# Patient Record
Sex: FEMALE | Race: WHITE | URBAN - NONMETROPOLITAN AREA
[De-identification: names, ages, dates, MRNs, and addresses within clinical notes are randomized per-mention and may not be internally consistent; named-entity substitution may affect disease eponyms.]

---

## 2019-02-26 NOTE — PATIENT DISCUSSION
Contact lens prescription issued.  Contact lens trials issued; patient to RTO for insertion and removal training.

## 2019-12-26 NOTE — PATIENT DISCUSSION
This visual field clearly demonstrated a minimum of 52% loss of upper field of vision OU, with upper lid skin in repose and elevated by taping of the lid to demonstrate potential correction. This field shows that taping the lids significantly improved this patient's superior field of vision by approximately 52%, OU.

## 2020-05-07 NOTE — PATIENT DISCUSSION
Patient informed of available non-opioid medications and other non-pharmacological options. Discussed advantages and disadvantages of the alternatives, including whether the patient is at-risk for, or has a history of, controlled substance abuse or misuse, and the patient’s personal preferences. [facility] “Opioid Alternative Pamphlet” was provided to patient.

## 2020-05-13 NOTE — PROCEDURE NOTE: SURGICAL
<strong>MR #:</strong>&nbsp; [id number]<br /><br /><strong>PREOPERATIVE DIAGNOSIS: &nbsp; </strong>1. Facial jowls. &nbsp; 2. Facial rhytids. &nbsp; 3. Platysmal bands. &nbsp; 4. Loss of volume to chin, prejowl and cheeks. <br />&nbsp; <strong>&nbsp; </strong><br /><strong>POSTOPERATIVE DIAGNOSIS: &nbsp; &nbsp; </strong>Same.<br /><br /><strong>PROCEDURE: &nbsp; </strong>1. Lower facelift. <strong>&nbsp;&nbsp;</strong>2. Platysmaplasty. &nbsp; 3. Total 9 cc&rsquo;s of SMAS&nbsp; 6cc injected into the chin, prejowl and 1.5 into the cheeks. <br /><strong>&nbsp; &nbsp; </strong><br /><strong>ANESTHESIA: &nbsp; &nbsp; </strong>Local MAC.<br /><br /><strong>ESTIMATED BLOOD LOSS: &nbsp; </strong>Minimal, &lt;60 cc <br /><br /><strong>COMPLICATIONS: &nbsp; </strong>None.<br /><br /><strong>INDICATION: </strong>This patient had jowl formation with poor definition of the cervico/mental angle and fullness in the submental area. We have decided to perform a lower facelift to better define the jaw line and neck. This patient also had * they wished to address at this time. &nbsp; We have discussed various treatment options and will fill in these areas with SMAS. The patient understands the risks and benefits including bleeding, infection, numbness, nerve injury, recurrent jowls, wrinkles and need for further surgery and wishes to proceed. &nbsp; <br /><strong>&nbsp;</strong><br /><strong>PROCEDURE: </strong>Prior to surgery, a time-out was performed in the operative area. The team, consisting of the surgeon, nurse anesthetist, circulating nurse, and two medical assistants were gathered around the patient. The patient&rsquo;s identity and name were confirmed, as well as and the site of the surgery and the type of surgery and procedure to be performed. The patient was then sedated under monitored anesthesia care. I proceeded with the marking of the patient with a blue marking pen, in the areas of surgery to be performed. The patient&rsquo;s facial wrinkles and jowls were marked, as were the proposed pre and post auricular and submental incisions designed and fashioned for maximum scar camouflage. After measuring and marking the facial wrinkles, the fatty pad under the chin was palpated and marked in its dimensions. The incision lines were injected with 24 cc&rsquo;s of Xylocaine 2% with epinephrine along the pre-marked incision lines under the chin and around the ears. The patient then had Alcaine drops placed in both eyes as well as eye lubricant. Next, the patient&rsquo;s face was prepped with Betadine solution and draped in a typical sterile fashion as per facial plastic surgery. <br /><br />First a 15 blade was then used to make incisions under the chin and in front and behind of both ears in the pre-marked areas. A Tumescent anesthesia solution consisting of 50 cc's of Xylocaine 2% with 1 cc of 1:1000 epinephrine, 12.5 cc's of Sodium Bicarbonate and 0.25 cc's of Triamolone added to a 500 ml bag of 0.9% Sodium Chloride was introduced through the previously made incisions with a garden spray cannula. Infiltration of this solution was carried out in the subdermal plane along the right and left face and under the chin. This was performed to provide anesthesia and hemostasis and create a dissection plane. Approximately 400 ml in total was used. A double pronged skin hook was then placed into the chin incision and a facelift scissor was used to remove excess submental fat off the platysmal bands, releasing tethers from the underlying subcutaneous tissue. &nbsp; This was done with full skin release from side to side. &nbsp; Once this was performed all bleeders were cauterized with a bipolar cautery. &nbsp; A retractor was placed into the submental wound. &nbsp; The platysma bands were easily visualized. The platysma bands were then sutured in the midline with a running 3-0 vicryl suture bringing together the two ends of the platysma muscle medially from the chin down to the thyroid cartilage to tighten the neck. &nbsp; After this was performed the submental wound was then closed with interrupted buried 5-0 vicryl sutures deep then a running 5-0 prolene suture superficially. <br /><br />Next the head was then turned to the right and two double prong skin hooks were then placed through the skin flaps for upward traction. A facelift scissors was used to elevate the right part of the facial skin flap. Care was taken to dissect superficially above this SMAS area to avoid any injury to the facial nerve or vascular structures. This dissection was extended with the help of Aviir. Undermining was performed to 6cm anterior to the pre-auricular area on the right side and then downward over the jaw line onto the neck. This connected the lateral pocket to the central previously dissected pocket allowing full skin release. Dissection was carried down over the skin of the posterior neck and elevation of the flap was achieved using counter traction against the facelift scissors releasing the full skin flap on the right side. Care was taken not to buttonhole this thin skin and also not to dissect too deep in this area, avoiding subsequent injury to the greater auricular nerve. After elevation of this large skin flap on the right side, all bleeders were cauterized with a bipolar cautery. After excellent hemostasis was achieved, attention was turned to SMAS elevation. A facelift scissors and Adson forceps were used to perform a SMAS stripectomy. A vertical and horizontal strip of SMAS overlying the parotid fascia in an upside down &ldquo;L&rdquo; configuration was excised down to intact parotid fascia. The SMAS strip that was removed in the preauricular area, measured approximately 1cm in diameter by 5 cm in length. All bleeders were cauterized using a bipolar cautery. Care was taken to stay above the facial nerve. This SMAS layer was then lifted anteriorly in a vertical direction and plicated by placing 4 interrupted 3 0 Vicryl buried sutures through this SMAS and advancing it superiorly to tighten the neck. The first suture was placed through the SMAS at the angle of the mandible and advanced upward to the pre tragal fascia. This was secured and three additional sutures were placed superiorly to this to further advance this SMAS superiorly. Likewise, three interrupted Vicryl sutures were placed through the inferior aspect of the platysma muscle and advanced laterally and sutured to the posterior mastoid fascia. These sutures were secured with an excellent tightening seen of the jaw line and lower face. After this was performed, the skin was gently re-draped two over the right ear. The excess skin on the right was marked and then first trimmed in the temple area and in the mastoid area. Two marking sutures at 3-0 plain sutures were used to secure the skin in place in the pre-auricular and mastoid area. The tissue in the temporal area and mastoid area was re-approximated using a layered closure of 5-0 vicryl deep and 5-0 prolene sutures superficially. The preauricular excess skin was then excised with a facelift scissors above and below the tragus and this pre tragal area trimmed and thinned using a facelift scissors. A 5 0 vicryl deep suture and 5-0 prolene running suture was then used to re-approximate the pre-auricular wound. The skin flap around the ear was tension free, avoiding any downward pull on this earlobe. Posteriorly excess skin was then trimmed from the posterior ear. The remaining aspect to this posterior ear wound was closed with 5-0 vicryl deep and 5-0 chromic sutures superficially. <br /><br />After completion of the right side of the face, the head was then turned to the left where the same operation was performed. At the conclusion of the case, 9cc&rsquo;s of SMAS in total was injected. &nbsp; 6cc&rsquo;s into chin, prejowl and 1.5 into the cheeks to smooth the appearance and volume to these areas. The facial wounds were cleaned with peroxide and Vaseline soaked gauze and antibiotic ointment was placed over her wounds on the areas below the chin and around both ears. Fluffs were placed over both ears as was a Kerlix dressing. This dressing was then secured to the head using an Ace bandage. The patient had received 8 mg of Decadron, 1g of Ancef and 4 mg of Zofran in the operating room. The patient tolerated the procedure well and was sent to the recovery room in stable condition. <br />

## 2020-05-13 NOTE — PATIENT DISCUSSION
Patient informed of available non-opioid medications and other non-pharmacological options. Discussed advantages and disadvantages of the alternatives, including whether the patient is at-risk for, or has a history of, controlled substance abuse or misuse, and the patient’s personal preferences. [facility] “Opioid Alternative Pamphlet” was provided to patient.

## 2020-05-13 NOTE — PATIENT DISCUSSION
Patient informed of available non-opioid medications and other non-pharmacological options. Discussed advantages and disadvantages of the alternatives, including whether the patient is at-risk for, or has a history of, controlled substance abuse or misuse, and the patient’s personal preferences. [facility] “Opioid Alternative Pamphlet” was provided to patient.

## 2020-06-04 NOTE — PATIENT DISCUSSION
3 week post op: great curve and shape, no infection, healing well. Wear sunglasses and hat while outdoors. Avoid sun exposure. No restrictions in 5 days.

## 2020-06-04 NOTE — PATIENT DISCUSSION
EEG Electrode Placement    Procedure Performed: EMU    Interpreting Physician:  Luda    Patient Room:      Measurements:  10-20 System    Supplies: Glue -  Collodion and Paste -  Ten20    Skin was prepped with Cavilon wipe.  Foam dressing applied behind each electrode on exposed skin to minimize skin irritation.    Electrode placement adjusted due to:  Drain and Incision T4, T6 shifted posterior; T3, T5 shifted posterior; P4 shifted slightly downward     Monitor cataract for progression.

## 2020-06-04 NOTE — PATIENT DISCUSSION
3 week post op: great curve and shape, no infection, healing well. Use Vitamin E oil/Skinuva QHS x 1 month to incisions after 10 days. Wear sunglasses and hat while outdoors. Avoid sun exposure.

## 2020-10-22 NOTE — PATIENT DISCUSSION
Post op: great curve and shape, no infection, healing well. Wear sunglasses and hat while outdoors. Avoid sun exposure.

## 2021-07-23 ENCOUNTER — IMPORTED ENCOUNTER (OUTPATIENT)
Dept: URBAN - NONMETROPOLITAN AREA CLINIC 1 | Facility: CLINIC | Age: 51
End: 2021-07-23

## 2021-07-23 PROBLEM — H52.222: Noted: 2021-07-23

## 2021-07-23 PROBLEM — H52.4: Noted: 2021-07-23

## 2021-07-23 PROBLEM — H52.13: Noted: 2021-07-23

## 2021-07-23 PROCEDURE — 92310 CONTACT LENS FITTING OU: CPT

## 2021-07-23 PROCEDURE — S0620 ROUTINE OPHTHALMOLOGICAL EXA: HCPCS

## 2021-07-23 NOTE — PATIENT DISCUSSION
Simple Myopia OD/Compound Myopic Astigmatism OS w/Presbyopia-  discussed findings w/patient-  new spectacle/CL Rx issued-  continue to monitor yearly or prn; 's Notes: MR 7/23/2021DFE 7/23/2021

## 2022-04-09 ASSESSMENT — VISUAL ACUITY
OD_SC: 20/20
OU_SC: 20/20
OU_CC: 20/20
OS_SC: 20/20

## 2022-04-09 ASSESSMENT — TONOMETRY
OS_IOP_MMHG: 15
OD_IOP_MMHG: 16

## 2022-04-09 ASSESSMENT — KERATOMETRY
OS_K1POWER_DIOPTERS: 43.75
OD_AXISANGLE_DEGREES: 045
OD_K1POWER_DIOPTERS: 43.25
OS_AXISANGLE_DEGREES: 138
OD_K2POWER_DIOPTERS: 43.75
OS_K2POWER_DIOPTERS: 44.00

## 2022-10-18 NOTE — PATIENT DISCUSSION
Recommend Bilateral upper lid blepharoplasty. Predeterm(discussed risks and benefits of sx. .. ). none

## 2022-12-19 NOTE — PATIENT DISCUSSION
Recommend using Sente Retinol Product. Tano Arce Patient Age: 35 year old  MESSAGE: Interpreting service used: No    Insurance on file confirmed with caller: Yes    IM/FP- Symptom-  Adult-  Other-     Symptom(s): blood sugar was at 330 12/18 today is 290          Appointment: Patient scheduled an appointment on 12/20  at 8:30 a.m. with Dr. Rodriguez  at [facility]. Caller is requesting a sooner appointment.     Site: [facility]- Routed message to RN pool [[phone]]    Advised caller that a nurse will return the call.     Message read back to caller for accuracy: Yes       ALLERGIES:  Patient has no known allergies.  Current Outpatient Medications   Medication Sig Dispense Refill   • fluticasone (FLONASE) 50 MCG/ACT nasal spray Spray 1 spray in each nostril in the morning and 1 spray in the evening. 16 g 0   • amoxicillin (AMOXIL) 500 MG tablet Take 1 tablet by mouth in the morning and 1 tablet in the evening. Do all this for 10 days. 20 tablet 0   • diazePAM (Valium) 5 MG tablet Take 1 tablet by mouth 3 times daily as needed for Muscle spasms. 15 tablet 0   • guaiFENesin (MUCINEX) 600 MG 12 hr tablet Take 1-2 tablets by mouth 2 times daily. 16 tablet 0     No current facility-administered medications for this visit.     PHARMACY to use: see below          Pharmacy preference(s) on file:   [id number] - [place], [place] - [address] AT [facility] [address]  [address]  [facility] [address]  Phone: [phone] Fax: [phone]      CALL BACK INFO: Ok to leave response (including medical information) on answering machine      PCP: Kay Rodriguez MD         INS: Payor: BLUE [employer]OSS BLUE [employer]ELD [place] / Plan: PPO [id number] / Product Type: PPO MISC   PATIENT ADDRESS:  [address] [place] [id number]

## 2023-12-18 NOTE — PROCEDURE NOTE: SURGICAL
1.  Please return to clinic at your next scheduled visit.  Contact the clinic ([phone]) at least 24 hours prior in the event you need to cancel.  2.  Do no harm to yourself or others.    3.  Avoid alcohol and drugs.    4.  Take all medications as prescribed.  Please contact the clinic with any concerns. If you are in need of medication refills, please call the clinic at [phone].    5. Should you want to get in touch with your provider, Dr. Melissa Monte, please utilize [facility] or contact the office ([phone]), and staff will be able to page Dr. Monte directly.  6.  In the event you have personal crisis, contact the following crisis numbers: Suicide Prevention Hotline 1-143.806.9579; PETEY Helpline 5-036-714-PETEY; [facility] Emergency Room [phone]; text HELLO to 948653; or 675.                MR #:&nbsp; [id number]<br /><br />PREOPERATIVE DIAGNOSIS:&nbsp; &nbsp; &nbsp;1. Dermatochalasis upper lids; 2. Lower lid fat herniation and excess skin<br /><br />POSTOPERATIVE DIAGNOSIS:&nbsp; &nbsp;Same<br /><br />PROCEDURE:&nbsp; &nbsp;1. Upper lid blepharoplasty both eyes; 2. C02 Laser transconjunctival blepharoplasty lower lids; 3. Skin resurfacing of lower lids<br /><br />ANESTHESIA:&nbsp; &nbsp; &nbsp; Local MAC<br /><br />ESTIMATED BLOOD LOSS:&nbsp; &nbsp; Minimal, &lt;5 cc<br /><br />COMPLICATIONS:&nbsp; &nbsp; None<br /><br />INDICATION:&nbsp; This patient had complaints of heavy skin and muscle of the upper eyelids that comes down over the eyelids and affects vision. &nbsp; Examination complete with a photograph of the patient confirmed extra upper lid skin that hangs over the eye and blocks vision. Superior visual field defects were also documented on Butler visual field with marked improvement in the superior scotomas after taping the lids up. We have discussed that a standard blepharoplasty operation would remove this extra tissue from the upper lids and allow an improvement in their abnormal visual field. Patient understands the risks and benefits, including the risk of ectropion and scleral show and wishes to proceed. This patient also has large herniated fat on the lower lids and redundant skin of the lower lids. We decided to perform a lower blepharoplasty operation to remove lower lid fat bags and to lightly resurface the skin to tighten them up. Patient understands the risks and benefits of the surgery and wishes to proceed. <br /><br />PROCEDURE:&nbsp; The patient was brought to the operating room and laid in the supine position. Prior to surgery, a time-out was performed in the operating room, and the nurse confirmed the patient&rsquo;s identity and name, the side and site of the surgery and the type of surgery and procedure to be performed. &nbsp; I proceeded with the marking of the patient with a marking pen, in the areas of surgery to be performed. The patient had upper lid crease markings drawn out with a marking pen. The skin of the upper lids was then tentatively bunched with two forceps to determine the amount of skin to be removed so the patient could still close their eye and not have lagophthalmos. The superior portion of the blepharoplasty incision line was then marked in an eclipse with a marking pen. The patient was then sedated and injected with Xylocaine 2% with epinephrine. &nbsp; Approximately 6cc&rsquo;s were used for both lids. &nbsp; A drop of Alcaine was placed over both eyes. The patient was then prepped and draped in typical fashion for facial plastic surgery. Laser safe protective corneal lenses were then placed over both eyes. Two forceps were then used to recheck the upper lid measurements and the patient was remarked with a blue marking pen in a spindle shape fashion on each upper lid. A 15 blade was first used to make an incision through each pre-marked area on each upper lid. &nbsp; A Bovie cautery on cutting mode with a [place] tip was then used to remove a skin muscle flap on each upper lid. &nbsp; The orbital septum was opened and the orbital fat selectively removed with the Bovie Cautery from each upper lid. Meticulous hemostasis was achieved. After cauterizing all bleeders, each wound was then closed with running 6-0 nylon sutures. &nbsp; The patient was inspected for contour and shape. An excellent appearance was noted. <br /><br />Attention was turned to the lower blepharoplasty operation where a rake was then inserted into the right lower lid, retracting it downward. A transconjunctival incision was made 2mm below the tarsus with the Bovie cautery. This incision was carried across the extent of the inner lid. Dissection was carried down with the Bovie cautery while pulling upward on the conjunctiva and retractor layer with a forceps. The fat was released from the septum and allowed to herniate forward exposing the nasal, central and temporal fat pads. Care was taken to avoid the inferior oblique muscle. These pads were teased forward and isolated. The fat was removed selectively with a Bovie cautery with excellent hemostasis. &nbsp; After judging adequate fat removal, the left lower lid had similar procedure. The skin was then resurfaced using the resurfacing CO2 laser on a low setting of 16 sun scan mode for an initial pass on both lower lids. This was done to tighten up the skin to remove wrinkles. This patient did have lid laxity so the laser was not aggressively performed on this patient in order to avoid ectropion. A second pass on the malar area was performed with the C02 laser set again at 16 sun. This was done to tighten up the malar area so fluid bags would not collect on the cheeks of this patient. At the end of this procedure the laser lens were removed. The patient was cleaned; antibiotic salve was placed on the wounds. The patient was sent to recovery room in stable condition. <br />
